# Patient Record
Sex: FEMALE | Race: WHITE | Employment: STUDENT | URBAN - METROPOLITAN AREA
[De-identification: names, ages, dates, MRNs, and addresses within clinical notes are randomized per-mention and may not be internally consistent; named-entity substitution may affect disease eponyms.]

---
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
## 2022-03-19 PROBLEM — N94.6 DYSMENORRHEA: Status: ACTIVE | Noted: 2017-07-12

## 2022-12-24 NOTE — TELEPHONE ENCOUNTER
"[facility]  Negro Camacho Jr., M.D., F.A.C.S.  Calli Steward M.D., M.P.H.  Kira Mayers, Ph.D., CCC/SLP  Naz Claire M.M. (voice), M.A., CCC/SLP  Kilo Maynard M.M. (voice), M.A., CCC/SLP    Evaluation report    Clinician: Naz Claire M.M. (voice), M.A., CCC/SLP  Referring physician:  Self  Patient: Jhon Gu  Date of Visit: 2018    HISTORY  Chief complaint: Jhon \"Bill\" Brittanie is a 39 year old presenting today for evaluation of dysphonia.   CURRENT SYMPTOMS INCLUDE  VOICE    Professional [employer] in [employer] and [place] [employer]    Sinus issues before  and had trouble getting voice out.    Has been regularly using a Neilmed sinus rinse, mucinex, drinks water, humidifier.    Finally got some antibiotics a bout 10 days ago.  Feels better before showering, and then showering \"loosens\" up the mucus.    voice is unpredictable; cancelled a recent audition    Neck pain for a while, left side and wasn't sure if related to yoga    Singing can end up with a tired and tender voice    Talking is ok AM and now buzzy and goopy and dry.    Feels very dry.    [employer] concert this weekend, and he has a solo    COUGH/THROAT CLEARING    Little coughing tries not to clear his thorat, but drinks water instead     SWALLOWING    Denies issues    RESPIRATION    Denies issues     OTHER PERTINENT HISTORY    Allergies; managed with claritin    Staying at house with dogs for relatives and is allergic to dogs.    Past Medical History:   Diagnosis Date     Chronic sinusitis      No past surgical history on file.    OBJECTIVE  PATIENT REPORTED MEASURES    Effort to talk: 2 / 10 (0-10 in which 10 represents maximal effort)    Effort to sin / 10 (0-10 in which 10 represents maximal effort)    Voice quality: 3 / 10 (0-10 in which 10 represents best possible voice)  Patient Supplied Answers To VHI Questionnaire  Voice Handicap Index (VHI-10) 2018   My voice makes it difficult for people " Mom has been advised. "to hear me 1   People have difficulty understanding me in a noisy room 1   My voice difficulties restrict my personal and social life.  1   I feel left out of conversations because of my voice 1   My voice problem causes me to lose income 1   I feel as though I have to strain to produce voice 1   The clarity of my voice is unpredictable 2   My voice problem upsets me 3   My voice makes me feel handicapped 2   People ask, \"What's wrong with your voice?\" 1   VHI-10 14       Patient Supplied Answers To CSI Questionnaire  No flowsheet data found.    Patient Supplied Answers To EAT Questionnaire  No flowsheet data found.      PERCEPTUAL EVALUATION (CPT 95750)  POSTURE / TENSION:     upper body    neck and shoulders    BREATHING:     appears within normal limits and adequate     LARYNGEAL PALPATION:     firm musculature    tenderness of the thyrohyoid area    reduced thyrohyoid space    VOICE:    Roughness: Mild Intermittent    Breathiness: WNL    Strain: WNL    Loudness    Conversational speech:  WNL    Projected speech:  WNL    Pitch:    Conversational speech:  Mildly elevated    Pitch glide: neurologically normal/ connected    Resonance:    Conversational speech:  backward focus of resonance    Singing vs. Speech:  Singing is improved    CAPE-V Overall Severity:  25/100    COUGH/THROAT CLEARING:    Not observed    LARYNGEAL EXAMINATION  Procedure: Flexible endoscopy with chip-tip technology with stroboscopy, left nostril; topical anesthesia with 3% Lidocaine and 0.25% phenylephrine was applied.   Performed by: Naz Claire M.M. (voice), M.A., CCC/SLP   The laryngeal and pharyngeal structures were evaluated for gross appearance, mobility, function, and focal lesions / abnormalities of the associated mucosa.  Stroboscopy was warranted to evaluate closure, symmetry, and vibratory characteristics of the vocal folds.  All findings were within normal limits with the exception of the following salient features: "     Relatively healthy larynx    Mild to moderate AP constriction during running speech    Mildly incomplete due to subtle swellings along the vibratory margins at mid-fold    THERAPY PROBES: Improvement was elicited with use of forward resonant stimuli, coordination of respiration and phonation and use of yawn sigh    The addition of stroboscopy allowed evaluation of the mucosal wave:    Amplitude: right: WNL; left: WNL     Symmetry: good symmetry.    Closure pattern: subtle hourglass    Closure plane: at glottic level.     Phase distribution: normal.    Subtle leukoplakic/ fibrotic changes present along the vibratory margins at midfold.  He noted that these have been observed in the past.       Close up of subtle hourglass and changes along the vibratory marigns    The laryngeal exam was reviewed with Mr. Gu, and I provided pertinent explanations, as well as written and oral information.    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 Dysphonia/discomfort is compounded by the hyperfunction and imbalanced function of the intrinsic and extrinsic laryngeal musculature  .    STIMULABILITY: results of therapy probes during perceptual and laryngeal evaluation demonstrate improvement with use of forward resonant stimuli, coordination of respiration and phonation and use of yawn sigh    RECOMMENDATIONS:     A course of speech therapy is recommended to optimize vocal technique, improve voice quality, promote reduced discomfort, effort and fatigue, promote reduction of vocal fold impact to help resolve the lesions and help reduce chronic cough, throat clear and mucosal irritation.    He demonstrates a Good prognosis for improvement given adherence to therapeutic recommendations.     Positive indicators: positive response to therapy probes diagnosis is known to respond to  "treatment    Negative indicators: none    DURATION / FREQUENCY: 3 one-hour bi-weekly sessions. A total of 6-8 sessions may be necessary.    GOALS:  Patient goal:   1. To improve and maintain a healthy voice quality  2. To resolve the vocal fold lesions  3. To understand the problem and fix it as much as possible    Long-term goal(s): In 6 months, Mr. Gu will:  Report a week of typical vocal activities, in which dysphonia and throat discomfort/ fatigue do not exceed a level of 2 out of 10, 80% of the time   Report a speaking and singing voice quality that is acceptable to him, 90%of the time  Report resolution of dysphonia, and use of optimal voice quality to meet personal, social, and professional needs, 90% of the time during a typical week of vocal activities    This treatment plan was developed with the patient who agreed with the recommendations.  _______________________________________________________________________  THERAPY NOTE (CPT 65394)  Date of Service: 4/26/2018    THERAPEUTIC ACTIVITIES  Counseling and Education    Asked many questions about the nature of his symptoms, and I answered all of these thoroughly.    Semi-Occluded Vocal Tract (SOVT) exercises instructed to reduce laryngeal tension, promote vocal fold pliability, and coordinate respiration and phonation    Straw phonation with water resistance was found to be most facilitating     Sustained phonation, and voice vs. voiceless productions used to promote easy voicing and raise awareness of laryngeal tension    Ascending and descending glides utilized to promote vocal fold pliability    \"Messa di voce\", gradual crescendo and decrescendo to vary medial compression was also utilized to promote vocal fold pliability.    Instructed on the benefits of using these exercises for improved coordination of breath flow with phonation and tissue mobilization.    Instructed on the importance of using these exercises as a warm-up / cool down,  and to " "re-calibrate the voice throughout the day.    Resonant Voice Therapy (RVT) exercises to promote forward locus of resonance and optimized pattern of laryngeal adduction    Speech material that elicits a high, forward tongue position (/n/) was most facilitating    Easy descending glide on /n/ utilized in conjunction with relaxed jaw, tongue, and lightly closed lips to facilitate forward resonant sound    Use of the carrier phrase \"nnhnn\" instructed to promote generalization to everyday speech    Syllable level using /n/ in alternation with cardinal vowels on sustained pitches and speech inflection    Word level exercises featuring nasal continuant loaded stimuli    Phrase level exercises featuring nasal continuants in more complex phonemic contexts were employed    Instructed with and interval of a descending 5th, as well as an arpeggio pattern was facilitating, prior to progressing to comfortable speech using optimal breath flow.      Concepts of an optimal regimen for practice were instructed.  o He should use an interval schedule of practice, with brief periods of practice frequently throughout each day  o Yale concepts of volitional practice to facilitate motor learning.    I provided handouts of today's therapeutic activities to facilitate practice.    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     PLAN: I will see Mr. Gu in 2-3 weeks to begin a brief course of therapy.     TOTAL SERVICE TIME: 60 minutes  EVALUATION OF VOICE AND RESONANCE: (66210): 30 minutes    TREATMENT (12208): 15 minutes  ENDOSCOPIC LARYNGEAL EXAMINATION WITH STROBOSCOPY (66185): 30 minutes  NO CHARGE FACILITY FEE (68344)    Naz Claire M.M. (voice) MAlejandroAAlejandro, CCC/SLP  Speech-Language Pathologist  Certificate of Vocology  [facility] " "Cl[facility]  [phone]  [email or web address]  Prounouns: she/her      SOVT lip trill or bubbles, n+ vowels and phrases, 3-5-1, and yawn sighs.  2 - 3 sessions working on the singing voice.     Ent Slp Intake      Question    4/26/2018  2:47 PM CDT     Are you having any of these symptoms?  Throat irritation       Throat tightness       Pain/ache in throat       Mucus in throat       Poor voice quality     Are you having any of these symptoms?  Voice tires easily       Voice breaks       Change in vocal volume       Shaky/unsteady voice       Raspy voice     Do you use caffeine?  Yes     If you do use caffeine, how many oz. do you typically drink in a day?  1     How many oz. of non-caffeinated fluid do you typically drink in a day?  120     How often do you experience heartburn, indigestion, chest pain, stomach acid coming up, and/or tasting acid in your mouth or throat?  Weekly     If you marked \"Other\", please comment:       Have reflux medications been recommended to you?  No     If yes, are you taking them regularly?  N/A     Would you like to be evaluated for the following problems at this visit?  In the next section(s), we will ask you tell us more about each of the problem you select.       Voice:  Yes     Swallowing:  No     Cough and/or throat-clearing:  No     Breathing problem:  No     Throat discomfort and/or the feeling of a lump in your throat:  No     A different problem, not listed above:  No     Other physicians/health care providers who should receive a copy of today's note (please provide first and last name(s), city):       If anyone is helping you complete this form (such as an , clinic staff, caregiver, family member, etc.) please identify them here.       How long have you had this voice problem?  3 weeks     In regards to your voice problem, was there anything unusual about the time the problem was first noticed (such as illness, accident, surgery, etc.)?  If yes, please " describe.  You have 200 characters to respond.  We will ask for more detail at your visit.  sinusinfection     What do you think caused this voice problem?       How quickly did the voice problem develop?  Gradually     For your voice problem, how do the symptoms vary?  Worse after heavy voice use       Variable     Over time, how has the voice problem changed?  Worse     How would you rate your typical vocal demand?  Extraordinary: Very high voice demands; your work or personal success depends heavily on your voice quality     Please list activities that involve your voice (such as singing, coaching, etc.):  singing speaki ng     During the last 12 months, how much of a problem did you have with your voice?  No problem     On a scale of 0-10, please rate the following.       Effort for speaking  2     Effort for singing  4     Overall vocal quality  3     Please indicate how you feel about your voice problem.       There isn't much I can do to help myself feel better about this problem.  Agree somewhat     How I deal with the voice problem now is under my control.  Agree somewhat     I don't have much control over my emotional reactions to this problem.  Agree somewhat     When I am upset about the voice problem, I can find a way to feel better.  Agree somewhat     I have control over my day-to-day reactions to the voice problem.  Agree somewhat     There isn't much I can do to keep the voice problem from affecting me.  Agree somewhat     I have control over how I think about the voice problem.  Agree somewhat     My reaction to the voice problem is not under my control.  Agree somewhat     Is your voice ever normal, even briefly?  Yes     What health care professionals have you seen for this voice problem?  Who and when?       For your voice problem, what testing/studies have you done (such as imaging/reflux testing)?       Did you receive any therapy or treatment for your voice problem?  Please describe briefly.        Prior to this episode, have you ever had a voice problem before?  If yes, at that time did you have therapy or treatment for the voice problem?  Please provide a brief description of that treatment.       For your voice problem, is there anything else you'd like to tell us?       VPCMEAN (range: 1 - 32)  2                  3-5x/week

## 2023-05-20 RX ORDER — VENLAFAXINE HYDROCHLORIDE 150 MG/1
TABLET, EXTENDED RELEASE ORAL DAILY
COMMUNITY

## 2023-05-20 RX ORDER — ARIPIPRAZOLE 2 MG/1
2 TABLET ORAL DAILY
COMMUNITY

## 2023-05-20 RX ORDER — SERTRALINE HYDROCHLORIDE 100 MG/1
100 TABLET, FILM COATED ORAL DAILY
COMMUNITY
Start: 2017-05-25

## 2023-05-20 RX ORDER — NORETHINDRONE AND ETHINYL ESTRADIOL 0.4-0.035
1 KIT ORAL DAILY
COMMUNITY
Start: 2020-05-13

## 2023-05-20 RX ORDER — LAMOTRIGINE 25 MG/1
TABLET ORAL
COMMUNITY
Start: 2020-10-08

## 2023-05-20 RX ORDER — DESOGESTREL AND ETHINYL ESTRADIOL 0.15-0.03
KIT ORAL
COMMUNITY
Start: 2020-02-10

## 2023-05-20 RX ORDER — FLUOXETINE HYDROCHLORIDE 20 MG/1
CAPSULE ORAL
COMMUNITY
Start: 2018-07-20

## 2023-05-20 RX ORDER — NORELGESTROMIN AND ETHINYL ESTRADIOL 150; 35 UG/D; UG/D
PATCH TRANSDERMAL
COMMUNITY
Start: 2021-01-05

## 2023-05-20 RX ORDER — BUSPIRONE HYDROCHLORIDE 10 MG/1
20 TABLET ORAL 2 TIMES DAILY
COMMUNITY

## 2024-07-16 ENCOUNTER — OFFICE VISIT (OUTPATIENT)
Age: 24
End: 2024-07-16
Payer: COMMERCIAL

## 2024-07-16 VITALS
DIASTOLIC BLOOD PRESSURE: 77 MMHG | HEIGHT: 66 IN | WEIGHT: 289.6 LBS | SYSTOLIC BLOOD PRESSURE: 110 MMHG | BODY MASS INDEX: 46.54 KG/M2

## 2024-07-16 DIAGNOSIS — Z12.4 ENCOUNTER FOR PAPANICOLAOU SMEAR FOR CERVICAL CANCER SCREENING: ICD-10-CM

## 2024-07-16 DIAGNOSIS — Z01.419 ENCOUNTER FOR ANNUAL ROUTINE GYNECOLOGICAL EXAMINATION: Primary | ICD-10-CM

## 2024-07-16 PROCEDURE — 99395 PREV VISIT EST AGE 18-39: CPT | Performed by: OBSTETRICS & GYNECOLOGY

## 2024-07-16 RX ORDER — BREXPIPRAZOLE 3 MG/1
1 TABLET ORAL DAILY
COMMUNITY
Start: 2023-07-17

## 2024-07-16 RX ORDER — ESCITALOPRAM OXALATE 10 MG/1
20 TABLET ORAL
COMMUNITY
Start: 2023-01-05

## 2024-07-16 RX ORDER — SEMAGLUTIDE 2.4 MG/.75ML
INJECTION, SOLUTION SUBCUTANEOUS
COMMUNITY
Start: 2024-06-18

## 2024-07-16 RX ORDER — LISDEXAMFETAMINE DIMESYLATE 40 MG/1
CAPSULE ORAL
COMMUNITY
Start: 2023-01-05

## 2024-07-16 SDOH — ECONOMIC STABILITY: FOOD INSECURITY: WITHIN THE PAST 12 MONTHS, THE FOOD YOU BOUGHT JUST DIDN'T LAST AND YOU DIDN'T HAVE MONEY TO GET MORE.: NEVER TRUE

## 2024-07-16 SDOH — ECONOMIC STABILITY: HOUSING INSECURITY
IN THE LAST 12 MONTHS, WAS THERE A TIME WHEN YOU DID NOT HAVE A STEADY PLACE TO SLEEP OR SLEPT IN A SHELTER (INCLUDING NOW)?: NO

## 2024-07-16 SDOH — ECONOMIC STABILITY: FOOD INSECURITY: WITHIN THE PAST 12 MONTHS, YOU WORRIED THAT YOUR FOOD WOULD RUN OUT BEFORE YOU GOT MONEY TO BUY MORE.: NEVER TRUE

## 2024-07-16 SDOH — ECONOMIC STABILITY: INCOME INSECURITY: HOW HARD IS IT FOR YOU TO PAY FOR THE VERY BASICS LIKE FOOD, HOUSING, MEDICAL CARE, AND HEATING?: NOT HARD AT ALL

## 2024-07-16 ASSESSMENT — PATIENT HEALTH QUESTIONNAIRE - PHQ9
SUM OF ALL RESPONSES TO PHQ9 QUESTIONS 1 & 2: 0
SUM OF ALL RESPONSES TO PHQ QUESTIONS 1-9: 0
1. LITTLE INTEREST OR PLEASURE IN DOING THINGS: NOT AT ALL
SUM OF ALL RESPONSES TO PHQ QUESTIONS 1-9: 0
2. FEELING DOWN, DEPRESSED OR HOPELESS: NOT AT ALL
SUM OF ALL RESPONSES TO PHQ QUESTIONS 1-9: 0
SUM OF ALL RESPONSES TO PHQ QUESTIONS 1-9: 0

## 2024-07-16 NOTE — PROGRESS NOTES
Bari Pettit is a 23 y.o. female returns for an annual exam     No chief complaint on file.      No LMP recorded.  Her periods are absent in flow   Problems: no problems  Birth Control: OCP (estrogen/progesterone).  Last Pap: done in office today   She does not have a history of JESÚS 2, 3 or cervical cancer.   With regard to the Gardisil vaccine, she received 3 of the 3 injections      1. Have you been to the ER, urgent care clinic, or hospitalized since your last visit? No    2. Have you seen or consulted any other health care providers outside of the [facility] since your last visit? No    Examination chaperoned by Kevin Rowell LPN.  
issues.    Plan:  Counseled re: diet, exercise, healthy lifestyle  Return for yearly wellness visits  Gardisil counseling provided  Pt counseled regarding co-testing for high risk HPV with pap  Rec screening mammo at either 37 or 42

## 2024-07-18 LAB
., LABCORP: NORMAL
C TRACH RRNA CVX QL NAA+PROBE: NEGATIVE
CYTOLOGIST CVX/VAG CYTO: NORMAL
CYTOLOGY CVX/VAG DOC CYTO: NORMAL
CYTOLOGY CVX/VAG DOC THIN PREP: NORMAL
DX ICD CODE: NORMAL
Lab: NORMAL
N GONORRHOEA RRNA CVX QL NAA+PROBE: NEGATIVE
OTHER STN SPEC: NORMAL
STAT OF ADQ CVX/VAG CYTO-IMP: NORMAL

## 2024-09-23 ENCOUNTER — OFFICE VISIT (OUTPATIENT)
Facility: CLINIC | Age: 24
End: 2024-09-23
Payer: COMMERCIAL

## 2024-09-23 VITALS
OXYGEN SATURATION: 100 % | RESPIRATION RATE: 18 BRPM | WEIGHT: 284 LBS | SYSTOLIC BLOOD PRESSURE: 108 MMHG | DIASTOLIC BLOOD PRESSURE: 74 MMHG | HEART RATE: 88 BPM | BODY MASS INDEX: 44.57 KG/M2 | HEIGHT: 67 IN | TEMPERATURE: 98 F

## 2024-09-23 DIAGNOSIS — N61.0 NIPPLE INFECTION IN FEMALE: ICD-10-CM

## 2024-09-23 DIAGNOSIS — Z76.89 ENCOUNTER FOR WEIGHT MANAGEMENT: ICD-10-CM

## 2024-09-23 DIAGNOSIS — Z76.89 ENCOUNTER TO ESTABLISH CARE: Primary | ICD-10-CM

## 2024-09-23 PROCEDURE — 99204 OFFICE O/P NEW MOD 45 MIN: CPT

## 2024-09-23 RX ORDER — MUPIROCIN 20 MG/G
OINTMENT TOPICAL
Qty: 1 G | Refills: 1 | Status: SHIPPED | OUTPATIENT
Start: 2024-09-23 | End: 2024-09-30

## 2024-09-23 RX ORDER — TIRZEPATIDE 2.5 MG/.5ML
2.5 INJECTION, SOLUTION SUBCUTANEOUS WEEKLY
Qty: 2 ML | Refills: 0 | Status: SHIPPED | OUTPATIENT
Start: 2024-09-23

## 2024-09-23 ASSESSMENT — PATIENT HEALTH QUESTIONNAIRE - PHQ9
5. POOR APPETITE OR OVEREATING: NOT AT ALL
10. IF YOU CHECKED OFF ANY PROBLEMS, HOW DIFFICULT HAVE THESE PROBLEMS MADE IT FOR YOU TO DO YOUR WORK, TAKE CARE OF THINGS AT HOME, OR GET ALONG WITH OTHER PEOPLE: SOMEWHAT DIFFICULT
SUM OF ALL RESPONSES TO PHQ QUESTIONS 1-9: 12
SUM OF ALL RESPONSES TO PHQ QUESTIONS 1-9: 12
6. FEELING BAD ABOUT YOURSELF - OR THAT YOU ARE A FAILURE OR HAVE LET YOURSELF OR YOUR FAMILY DOWN: NEARLY EVERY DAY
3. TROUBLE FALLING OR STAYING ASLEEP: NEARLY EVERY DAY
SUM OF ALL RESPONSES TO PHQ9 QUESTIONS 1 & 2: 3
7. TROUBLE CONCENTRATING ON THINGS, SUCH AS READING THE NEWSPAPER OR WATCHING TELEVISION: NOT AT ALL
1. LITTLE INTEREST OR PLEASURE IN DOING THINGS: NOT AT ALL
SUM OF ALL RESPONSES TO PHQ QUESTIONS 1-9: 12
4. FEELING TIRED OR HAVING LITTLE ENERGY: NEARLY EVERY DAY
SUM OF ALL RESPONSES TO PHQ QUESTIONS 1-9: 12
2. FEELING DOWN, DEPRESSED OR HOPELESS: NEARLY EVERY DAY
8. MOVING OR SPEAKING SO SLOWLY THAT OTHER PEOPLE COULD HAVE NOTICED. OR THE OPPOSITE, BEING SO FIGETY OR RESTLESS THAT YOU HAVE BEEN MOVING AROUND A LOT MORE THAN USUAL: NOT AT ALL
9. THOUGHTS THAT YOU WOULD BE BETTER OFF DEAD, OR OF HURTING YOURSELF: NOT AT ALL

## 2024-09-23 NOTE — PROGRESS NOTES
\"Have you been to the ER, urgent care clinic since your last visit?  Hospitalized since your last visit?\"    NO    “Have you seen or consulted any other health care providers outside our system since your last visit?”    NO      Chief Complaint   Patient presents with    Establish Care     /74 (Site: Left Upper Arm, Position: Sitting, Cuff Size: Large Adult)   Pulse 88   Temp 98 °F (36.7 °C) (Temporal)   Resp 18   Ht 1.689 m (5' 6.5\")   Wt 128.8 kg (284 lb)   SpO2 100%   BMI 45.15 kg/m²     Click Here for Release of Records Request    
hard at all   Food Insecurity: No Food Insecurity (7/16/2024)    Hunger Vital Sign     Worried About Running Out of Food in the Last Year: Never true     Ran Out of Food in the Last Year: Never true   Transportation Needs: Unknown (7/16/2024)    PRAPARE - Transportation     Lack of Transportation (Medical): Not on file     Lack of Transportation (Non-Medical): No   Physical Activity: Not on file   Stress: Not on file   Social Connections: Not on file   Intimate Partner Violence: Not on file   Housing Stability: Unknown (7/16/2024)    Housing Stability Vital Sign     Unable to Pay for Housing in the Last Year: Not on file     Number of Places Lived in the Last Year: Not on file     Unstable Housing in the Last Year: No     Family History   Problem Relation Age of Onset    Diabetes Mother     Cancer Paternal Grandfather     Heart Disease Paternal Grandfather        Current Outpatient Medications on File Prior to Visit   Medication Sig Dispense Refill    Levonorgestrel (KYLEENA IU) 1 Units by IntraUTERine route      escitalopram (LEXAPRO) 10 MG tablet 2 tablets      REXULTI 3 MG TABS tablet Take 1 tablet by mouth daily      VYVANSE 40 MG CAPS  (Patient not taking: Reported on 9/23/2024)       No current facility-administered medications on file prior to visit.     Allergies   Allergen Reactions    [place] Oil Bitter Flavor [Flavoring Agent] Itching    Banana Itching    Coconut Oil-Flaxseed Oil Hives     Review of Systems   Constitutional:  Negative for chills and fatigue.   Respiratory:  Negative for chest tightness and shortness of breath.    Cardiovascular:  Negative for chest pain, palpitations and leg swelling.   Neurological:  Negative for headaches.   Psychiatric/Behavioral: Negative.           Objective  Vitals:    09/23/24 1505   BP: 108/74   Site: Left Upper Arm   Position: Sitting   Cuff Size: Large Adult   Pulse: 88   Resp: 18   Temp: 98 °F (36.7 °C)   TempSrc: Temporal   SpO2: 100%   Weight: 128.8 kg (284

## 2024-09-25 ENCOUNTER — TELEPHONE (OUTPATIENT)
Facility: CLINIC | Age: 24
End: 2024-09-25

## 2024-09-25 NOTE — TELEPHONE ENCOUNTER
This is a:  []PA Request    []Covermymeds Key:  [x]PA Denial  []PA approval  []PA follow up    Medication/s:   Mounjaro 2.5 Denial    Was this a phone call?:  []Yes   [x]No  If Yes:  Who called?  What is their best call back number?     Notes:

## 2024-09-26 NOTE — TELEPHONE ENCOUNTER
This is a:  []PA Request    []Covermymeds Key:  []PA Denial  []PA approval  [x]PA follow up    Medication/s:  Mounjaro    Was this a phone call?:  [x]Yes   []No  If Yes:  Who called? Patient- Shiva  What is their best call back number? [place]- [phone]    Notes:   Pt called in stating that she spoke w/ [employer] and they notified her that she needed a Prior auth for her Mounjaro. Notified pt of recent denial that we received and that it was denied due to not having a DMII dx. Pt inquired if maybe some bloodwork could be done to see if that would be a possibility. Notified her that we can send request to provider and see what she further advises.

## 2024-09-30 DIAGNOSIS — Z76.89 ENCOUNTER FOR WEIGHT MANAGEMENT: Primary | ICD-10-CM

## 2024-09-30 NOTE — TELEPHONE ENCOUNTER
Mounjaro was denied as not having a dx of diabetes. Patient is requesting labs to determine if she is diabetic

## 2024-09-30 NOTE — PROGRESS NOTES
Will place orders for labs to be checked. Make sure fasting since will also be checking cholesterol.       Cindy

## 2024-10-01 NOTE — TELEPHONE ENCOUNTER
I spoke with the patient and advised that lab order has been submitted and that she can come to the office to have them drawn.

## 2024-10-11 ENCOUNTER — TELEPHONE (OUTPATIENT)
Facility: CLINIC | Age: 24
End: 2024-10-11

## 2024-10-11 LAB
ALBUMIN SERPL-MCNC: 4.1 G/DL (ref 4–5)
ALP SERPL-CCNC: 70 IU/L (ref 44–121)
ALT SERPL-CCNC: 14 IU/L (ref 0–32)
AST SERPL-CCNC: 16 IU/L (ref 0–40)
BASOPHILS # BLD AUTO: 0 X10E3/UL (ref 0–0.2)
BASOPHILS NFR BLD AUTO: 1 %
BILIRUB SERPL-MCNC: 0.3 MG/DL (ref 0–1.2)
BUN SERPL-MCNC: 8 MG/DL (ref 6–20)
BUN/CREAT SERPL: 12 (ref 9–23)
CALCIUM SERPL-MCNC: 9.3 MG/DL (ref 8.7–10.2)
CHLORIDE SERPL-SCNC: 107 MMOL/L (ref 96–106)
CHOLEST SERPL-MCNC: 164 MG/DL (ref 100–199)
CO2 SERPL-SCNC: 18 MMOL/L (ref 20–29)
CREAT SERPL-MCNC: 0.67 MG/DL (ref 0.57–1)
EGFRCR SERPLBLD CKD-EPI 2021: 127 ML/MIN/1.73
EOSINOPHIL # BLD AUTO: 0.1 X10E3/UL (ref 0–0.4)
EOSINOPHIL NFR BLD AUTO: 2 %
ERYTHROCYTE [DISTWIDTH] IN BLOOD BY AUTOMATED COUNT: 12.9 % (ref 11.7–15.4)
GLOBULIN SER CALC-MCNC: 2.6 G/DL (ref 1.5–4.5)
GLUCOSE SERPL-MCNC: 90 MG/DL (ref 70–99)
HBA1C MFR BLD: 5.5 % (ref 4.8–5.6)
HCT VFR BLD AUTO: 40.8 % (ref 34–46.6)
HDLC SERPL-MCNC: 48 MG/DL
HGB BLD-MCNC: 13.5 G/DL (ref 11.1–15.9)
IMM GRANULOCYTES # BLD AUTO: 0 X10E3/UL (ref 0–0.1)
IMM GRANULOCYTES NFR BLD AUTO: 1 %
LDLC SERPL CALC-MCNC: 96 MG/DL (ref 0–99)
LYMPHOCYTES # BLD AUTO: 1.8 X10E3/UL (ref 0.7–3.1)
LYMPHOCYTES NFR BLD AUTO: 23 %
MCH RBC QN AUTO: 28.8 PG (ref 26.6–33)
MCHC RBC AUTO-ENTMCNC: 33.1 G/DL (ref 31.5–35.7)
MCV RBC AUTO: 87 FL (ref 79–97)
MONOCYTES # BLD AUTO: 0.4 X10E3/UL (ref 0.1–0.9)
MONOCYTES NFR BLD AUTO: 5 %
NEUTROPHILS # BLD AUTO: 5.4 X10E3/UL (ref 1.4–7)
NEUTROPHILS NFR BLD AUTO: 68 %
PLATELET # BLD AUTO: 277 X10E3/UL (ref 150–450)
POTASSIUM SERPL-SCNC: 4.1 MMOL/L (ref 3.5–5.2)
PROT SERPL-MCNC: 6.7 G/DL (ref 6–8.5)
RBC # BLD AUTO: 4.68 X10E6/UL (ref 3.77–5.28)
SODIUM SERPL-SCNC: 141 MMOL/L (ref 134–144)
TRIGL SERPL-MCNC: 111 MG/DL (ref 0–149)
TSH SERPL DL<=0.005 MIU/L-ACNC: 1.04 UIU/ML (ref 0.45–4.5)
VLDLC SERPL CALC-MCNC: 20 MG/DL (ref 5–40)
WBC # BLD AUTO: 7.9 X10E3/UL (ref 3.4–10.8)

## 2024-10-16 ENCOUNTER — TELEPHONE (OUTPATIENT)
Facility: CLINIC | Age: 24
End: 2024-10-16

## 2024-10-16 NOTE — TELEPHONE ENCOUNTER
Can we schedule an appointment to discuss oral medications. These are often stimulants that require more in depth charting and further assessment.     Thank you,   Cindy

## 2024-10-16 NOTE — TELEPHONE ENCOUNTER
Spoke with patient she said she needed the diabetes diagnosis and it was pre diabetic when she first got put on the shot her insurance changed they will cover the monjuro but only with a diabetic diagnosis she spoke with the insurance and they wont cover the shot unless it has a diabetic diagnosis last A1c 5.5.

## 2024-10-16 NOTE — TELEPHONE ENCOUNTER
I cannot give diabetic diagnosis without labs to back up diagnosis. I understand the frustration, but insurance has been cracking down on these medications. You are welcome to pay for it out of pocket if you would like. Or we may be able to get oral medications covered for weight loss.     Let me know,   Cindy

## 2024-10-16 NOTE — TELEPHONE ENCOUNTER
----- Message from MICHELLE Bojorquez sent at 10/15/2024 10:47 AM EDT -----  Bari,     Your labs are within normal range and negative for diabetes.     Let us know if you have any questions,   Cindy

## 2024-10-17 NOTE — TELEPHONE ENCOUNTER
Spoke with patient she has an appointment on 11/4/2024 she would like to keep but also getting a new appointment to start the meds and will use the 11/4/24 as a medication f/u as well.

## 2024-11-04 ENCOUNTER — OFFICE VISIT (OUTPATIENT)
Facility: CLINIC | Age: 24
End: 2024-11-04

## 2024-11-04 VITALS
TEMPERATURE: 97.8 F | DIASTOLIC BLOOD PRESSURE: 65 MMHG | RESPIRATION RATE: 18 BRPM | HEART RATE: 98 BPM | WEIGHT: 275 LBS | BODY MASS INDEX: 43.16 KG/M2 | SYSTOLIC BLOOD PRESSURE: 119 MMHG | OXYGEN SATURATION: 96 % | HEIGHT: 67 IN

## 2024-11-04 DIAGNOSIS — Z76.89 ENCOUNTER FOR WEIGHT MANAGEMENT: Primary | ICD-10-CM

## 2024-11-04 RX ORDER — DEXAMETHASONE 1 MG
1 TABLET ORAL 2 TIMES DAILY WITH MEALS
Qty: 20 TABLET | Refills: 0 | Status: SHIPPED | OUTPATIENT
Start: 2024-11-04 | End: 2024-11-10 | Stop reason: ALTCHOICE

## 2024-11-04 ASSESSMENT — PATIENT HEALTH QUESTIONNAIRE - PHQ9
SUM OF ALL RESPONSES TO PHQ QUESTIONS 1-9: 2
9. THOUGHTS THAT YOU WOULD BE BETTER OFF DEAD, OR OF HURTING YOURSELF: NOT AT ALL
2. FEELING DOWN, DEPRESSED OR HOPELESS: NOT AT ALL
7. TROUBLE CONCENTRATING ON THINGS, SUCH AS READING THE NEWSPAPER OR WATCHING TELEVISION: SEVERAL DAYS
10. IF YOU CHECKED OFF ANY PROBLEMS, HOW DIFFICULT HAVE THESE PROBLEMS MADE IT FOR YOU TO DO YOUR WORK, TAKE CARE OF THINGS AT HOME, OR GET ALONG WITH OTHER PEOPLE: NOT DIFFICULT AT ALL
5. POOR APPETITE OR OVEREATING: NOT AT ALL
SUM OF ALL RESPONSES TO PHQ QUESTIONS 1-9: 2
6. FEELING BAD ABOUT YOURSELF - OR THAT YOU ARE A FAILURE OR HAVE LET YOURSELF OR YOUR FAMILY DOWN: NOT AT ALL
SUM OF ALL RESPONSES TO PHQ QUESTIONS 1-9: 2
SUM OF ALL RESPONSES TO PHQ9 QUESTIONS 1 & 2: 0
SUM OF ALL RESPONSES TO PHQ QUESTIONS 1-9: 2
1. LITTLE INTEREST OR PLEASURE IN DOING THINGS: NOT AT ALL
8. MOVING OR SPEAKING SO SLOWLY THAT OTHER PEOPLE COULD HAVE NOTICED. OR THE OPPOSITE, BEING SO FIGETY OR RESTLESS THAT YOU HAVE BEEN MOVING AROUND A LOT MORE THAN USUAL: NOT AT ALL
4. FEELING TIRED OR HAVING LITTLE ENERGY: SEVERAL DAYS
3. TROUBLE FALLING OR STAYING ASLEEP: NOT AT ALL

## 2024-11-04 NOTE — PROGRESS NOTES
Subjective  Chief Complaint   Patient presents with    Follow-up     Weight loss medication      HPI:  Bari Pettit is a 24 y.o. female, established patient, who presents to discuss weight loss medication.     Out of wegovy as of 1 week ago of which she was getting elsewhere. Now Concerned that she will start to regain all of the weight she has lost. Insurance has been denying all GLPs and patient is not a candidate for stimulants given baseline tachycardia.   Has not been exercising much. Use to weight lift  Also following with Dietitian  A1C 5.5 on 10/10 labs. States before she was told that she had prediabetes.     May be open to metformin - unsure if has diagnosis of PCOS    Past Medical History:   Diagnosis Date    ADHD (attention deficit hyperactivity disorder)     Anxiety     Encounter for IUD insertion 1/15/2021 Kyleena    Major depression, recurrent (HCC)     Menorrhagia     Morbid obesity with BMI of 45.0-49.9, adult      Past Surgical History:   Procedure Laterality Date    WISDOM TOOTH EXTRACTION       Social History     Socioeconomic History    Marital status: Single     Spouse name: Not on file    Number of children: Not on file    Years of education: Not on file    Highest education level: Not on file   Occupational History    Not on file   Tobacco Use    Smoking status: Never    Smokeless tobacco: Never   Vaping Use    Vaping status: Never Used   Substance and Sexual Activity    Alcohol use: Yes     Comment: occ    Drug use: No    Sexual activity: Not Currently     Partners: Male     Birth control/protection: I.U.D.   Other Topics Concern    Not on file   Social History Narrative    Not on file     Social Determinants of Health     Financial Resource Strain: Low Risk  (7/16/2024)    Overall Financial Resource Strain (CARDIA)     Difficulty of Paying Living Expenses: Not hard at all   Food Insecurity: No Food Insecurity (7/16/2024)    Hunger Vital Sign     Worried About Running Out of Food in the

## 2024-11-04 NOTE — PROGRESS NOTES
\"Have you been to the ER, urgent care clinic since your last visit?  Hospitalized since your last visit?\"    Yes - Urgent Care - Oct 2024 - Flu     “Have you seen or consulted any other health care providers outside our system since your last visit?”    NO    Chief Complaint   Patient presents with    Follow-up     Weight loss medication      /65 (Site: Left Upper Arm, Position: Sitting, Cuff Size: Large Adult)   Pulse 98   Temp 97.8 °F (36.6 °C) (Temporal)   Resp 18   Ht 1.689 m (5' 6.5\")   Wt 124.7 kg (275 lb)   SpO2 96%   BMI 43.72 kg/m²

## 2024-11-10 ENCOUNTER — APPOINTMENT (OUTPATIENT)
Facility: HOSPITAL | Age: 24
End: 2024-11-10
Payer: COMMERCIAL

## 2024-11-10 ENCOUNTER — HOSPITAL ENCOUNTER (EMERGENCY)
Facility: HOSPITAL | Age: 24
Discharge: HOME | End: 2024-11-10
Attending: EMERGENCY MEDICINE
Payer: COMMERCIAL

## 2024-11-10 VITALS
WEIGHT: 275 LBS | HEART RATE: 89 BPM | DIASTOLIC BLOOD PRESSURE: 80 MMHG | OXYGEN SATURATION: 93 % | SYSTOLIC BLOOD PRESSURE: 127 MMHG | HEIGHT: 67 IN | TEMPERATURE: 98.1 F | BODY MASS INDEX: 43.16 KG/M2 | RESPIRATION RATE: 18 BRPM

## 2024-11-10 DIAGNOSIS — R05.1 ACUTE COUGH: Primary | ICD-10-CM

## 2024-11-10 PROCEDURE — 71046 X-RAY EXAM CHEST 2 VIEWS: CPT

## 2024-11-10 PROCEDURE — 6370000000 HC RX 637 (ALT 250 FOR IP): Performed by: EMERGENCY MEDICINE

## 2024-11-10 PROCEDURE — 99283 EMERGENCY DEPT VISIT LOW MDM: CPT

## 2024-11-10 PROCEDURE — 94640 AIRWAY INHALATION TREATMENT: CPT

## 2024-11-10 RX ORDER — PREDNISONE 50 MG/1
50 TABLET ORAL DAILY
Qty: 5 TABLET | Refills: 0 | Status: SHIPPED | OUTPATIENT
Start: 2024-11-10 | End: 2024-11-15

## 2024-11-10 RX ORDER — IPRATROPIUM BROMIDE AND ALBUTEROL SULFATE 2.5; .5 MG/3ML; MG/3ML
1 SOLUTION RESPIRATORY (INHALATION)
Status: COMPLETED | OUTPATIENT
Start: 2024-11-10 | End: 2024-11-10

## 2024-11-10 RX ORDER — ALBUTEROL SULFATE 90 UG/1
2 INHALANT RESPIRATORY (INHALATION) EVERY 4 HOURS PRN
Qty: 18 G | Refills: 3 | Status: SHIPPED | OUTPATIENT
Start: 2024-11-10

## 2024-11-10 RX ADMIN — IPRATROPIUM BROMIDE AND ALBUTEROL SULFATE 1 DOSE: 2.5; .5 SOLUTION RESPIRATORY (INHALATION) at 11:21

## 2024-11-10 ASSESSMENT — LIFESTYLE VARIABLES
HOW MANY STANDARD DRINKS CONTAINING ALCOHOL DO YOU HAVE ON A TYPICAL DAY: 1 OR 2
HOW OFTEN DO YOU HAVE A DRINK CONTAINING ALCOHOL: MONTHLY OR LESS

## 2024-11-10 ASSESSMENT — PAIN - FUNCTIONAL ASSESSMENT: PAIN_FUNCTIONAL_ASSESSMENT: NONE - DENIES PAIN

## 2024-11-10 NOTE — ED TRIAGE NOTES
To ED with c/o cough for a few weeks.  Dx with influenza on 10/11, felt better, then started coughing again.    [facility] with viral URI, but states isn't getting any better.    Has been taking Tessalon Pearls, Mucinex, DayQuil & NyQuil without much relief.

## 2024-11-26 ASSESSMENT — ENCOUNTER SYMPTOMS
CHEST TIGHTNESS: 0
SHORTNESS OF BREATH: 0

## 2024-12-17 NOTE — ED PROVIDER NOTES
[facility] [place]  EMERGENCY DEPARTMENT ENCOUNTER      Pt Name: Bari Pettit  MRN: [id number]  Birthdate 2000  Date of evaluation: 11/10/2024  Provider: Tayo Guevara MD      HISTORY OF PRESENT ILLNESS      [facility]  Patient presenting with a cough.  Patient has had a minimally productive cough for about a week now.  She says she was diagnosed with a viral URI urgent care on the fifth symptoms have continued.  Denies shortness of breath.  No fevers.  Non-smoker.  No chest pain      Nursing Notes were reviewed.    REVIEW OF SYSTEMS         Review of Systems  All systems reviewed were negative less otherwise document in the HPI      PAST MEDICAL HISTORY     Past Medical History:   Diagnosis Date   • ADHD (attention deficit hyperactivity disorder)    • Anxiety    • Encounter for IUD insertion 1/15/2021 [place]   • Major depression, recurrent (HCC)    • Menorrhagia    • Morbid obesity with BMI of 45.0-49.9, adult          SURGICAL HISTORY       Past Surgical History:   Procedure Laterality Date   • WISDOM TOOTH EXTRACTION           CURRENT MEDICATIONS       Previous Medications    ESCITALOPRAM (LEXAPRO) 10 MG TABLET    2 tablets    LEVONORGESTREL (KYLEENA IU)    1 Units by IntraUTERine route    REXULTI 3 MG TABS TABLET    Take 1 tablet by mouth daily    VYVANSE 40 MG CAPS           ALLERGIES     Ravenna oil bitter flavor [flavoring agent], Banana, and Coconut oil-flaxseed oil    FAMILY HISTORY       Family History   Problem Relation Age of Onset   • Diabetes Mother    • Cancer Paternal Grandfather    • Heart Disease Paternal Grandfather           SOCIAL HISTORY       Social History     Socioeconomic History   • Marital status: Single   Tobacco Use   • Smoking status: Never   • Smokeless tobacco: Never   Vaping Use   • Vaping status: Never Used   Substance and Sexual Activity   • Alcohol use: Yes     Comment: occ   • Drug use: No   • Sexual activity: Not Currently     Partners: Male     Birth  wheelchair